# Patient Record
Sex: MALE | Race: WHITE
[De-identification: names, ages, dates, MRNs, and addresses within clinical notes are randomized per-mention and may not be internally consistent; named-entity substitution may affect disease eponyms.]

---

## 2018-07-02 ENCOUNTER — HOSPITAL ENCOUNTER (OUTPATIENT)
Dept: HOSPITAL 53 - M WUC | Age: 58
End: 2018-07-02
Attending: INTERNAL MEDICINE
Payer: COMMERCIAL

## 2018-07-02 DIAGNOSIS — E78.5: ICD-10-CM

## 2018-07-02 DIAGNOSIS — I25.10: Primary | ICD-10-CM

## 2018-07-02 LAB
ANION GAP: 7 MEQ/L (ref 8–16)
BLOOD UREA NITROGEN: 8 MG/DL (ref 7–18)
CALCIUM LEVEL: 8.6 MG/DL (ref 8.5–10.1)
CARBON DIOXIDE LEVEL: 30 MEQ/L (ref 21–32)
CHLORIDE LEVEL: 103 MEQ/L (ref 98–107)
CHOLEST SERPL-MCNC: 145 MG/DL (ref ?–200)
CHOLESTEROL RISK RATIO: 3.54 (ref ?–5)
CREATININE FOR GFR: 0.74 MG/DL (ref 0.7–1.3)
GFR SERPL CREATININE-BSD FRML MDRD: > 60 ML/MIN/{1.73_M2} (ref 56–?)
GLUCOSE, FASTING: 108 MG/DL (ref 70–100)
HDLC SERPL-MCNC: 41 MG/DL (ref 40–?)
HEMATOCRIT: 44 % (ref 42–52)
HEMOGLOBIN: 15.6 G/DL (ref 13.5–17.5)
LDL CHOLESTEROL: 82.8 MG/DL (ref ?–100)
MEAN CORPUSCULAR HEMOGLOBIN: 30.5 PG (ref 27–33)
MEAN CORPUSCULAR HGB CONC: 35.5 G/DL (ref 32–36.5)
MEAN CORPUSCULAR VOLUME: 85.9 FL (ref 80–96)
NONHDLC SERPL-MCNC: 104 MG/DL
NRBC BLD AUTO-RTO: 0 % (ref 0–0)
PLATELET COUNT, AUTOMATED: 168 10^3/UL (ref 150–450)
POTASSIUM SERUM: 3.8 MEQ/L (ref 3.5–5.1)
RED BLOOD COUNT: 5.12 10^6/UL (ref 4.3–6.1)
RED CELL DISTRIBUTION WIDTH: 13.2 % (ref 11.5–14.5)
SODIUM LEVEL: 140 MEQ/L (ref 136–145)
TRIGLYCERIDES LEVEL: 106 MG/DL (ref ?–150)
WHITE BLOOD COUNT: 10.1 10^3/UL (ref 4–10)

## 2018-07-02 PROCEDURE — 80061 LIPID PANEL: CPT

## 2020-01-24 ENCOUNTER — HOSPITAL ENCOUNTER (EMERGENCY)
Dept: HOSPITAL 53 - M ED | Age: 60
Discharge: HOME | End: 2020-01-24
Payer: COMMERCIAL

## 2020-01-24 VITALS — SYSTOLIC BLOOD PRESSURE: 169 MMHG | DIASTOLIC BLOOD PRESSURE: 94 MMHG

## 2020-01-24 VITALS — DIASTOLIC BLOOD PRESSURE: 89 MMHG | SYSTOLIC BLOOD PRESSURE: 166 MMHG

## 2020-01-24 VITALS — WEIGHT: 178.38 LBS | BODY MASS INDEX: 28 KG/M2 | HEIGHT: 67 IN

## 2020-01-24 DIAGNOSIS — Z79.83: ICD-10-CM

## 2020-01-24 DIAGNOSIS — F41.9: ICD-10-CM

## 2020-01-24 DIAGNOSIS — Z86.73: ICD-10-CM

## 2020-01-24 DIAGNOSIS — Z95.1: ICD-10-CM

## 2020-01-24 DIAGNOSIS — F17.210: ICD-10-CM

## 2020-01-24 DIAGNOSIS — R10.13: Primary | ICD-10-CM

## 2020-01-24 DIAGNOSIS — I10: ICD-10-CM

## 2020-01-24 DIAGNOSIS — Z79.899: ICD-10-CM

## 2020-01-24 LAB
ALBUMIN SERPL BCG-MCNC: 3.5 GM/DL (ref 3.2–5.2)
ALT SERPL W P-5'-P-CCNC: 30 U/L (ref 12–78)
BASOPHILS # BLD AUTO: 0.1 10^3/UL (ref 0–0.2)
BASOPHILS NFR BLD AUTO: 0.5 % (ref 0–1)
BILIRUB CONJ SERPL-MCNC: 0.2 MG/DL (ref 0–0.2)
BILIRUB SERPL-MCNC: 0.6 MG/DL (ref 0.2–1)
BUN SERPL-MCNC: 11 MG/DL (ref 7–18)
CALCIUM SERPL-MCNC: 8.8 MG/DL (ref 8.5–10.1)
CHLORIDE SERPL-SCNC: 108 MEQ/L (ref 98–107)
CK MB CFR.DF SERPL CALC: 1.13
CK MB CFR.DF SERPL CALC: 1.19
CK MB SERPL-MCNC: 1.2 NG/ML (ref ?–3.6)
CK MB SERPL-MCNC: 1.6 NG/ML (ref ?–3.6)
CK SERPL-CCNC: 101 U/L (ref 39–308)
CK SERPL-CCNC: 142 U/L (ref 39–308)
CO2 SERPL-SCNC: 29 MEQ/L (ref 21–32)
CREAT SERPL-MCNC: 0.65 MG/DL (ref 0.7–1.3)
EOSINOPHIL # BLD AUTO: 0.2 10^3/UL (ref 0–0.5)
EOSINOPHIL NFR BLD AUTO: 1.4 % (ref 0–3)
GFR SERPL CREATININE-BSD FRML MDRD: > 60 ML/MIN/{1.73_M2} (ref 56–?)
GLUCOSE SERPL-MCNC: 112 MG/DL (ref 70–100)
HCT VFR BLD AUTO: 45.5 % (ref 42–52)
HGB BLD-MCNC: 14.7 G/DL (ref 13.5–17.5)
LIPASE SERPL-CCNC: 123 U/L (ref 73–393)
LYMPHOCYTES # BLD AUTO: 1.5 10^3/UL (ref 1.5–5)
LYMPHOCYTES NFR BLD AUTO: 14.3 % (ref 24–44)
MCH RBC QN AUTO: 29 PG (ref 27–33)
MCHC RBC AUTO-ENTMCNC: 32.3 G/DL (ref 32–36.5)
MCV RBC AUTO: 89.7 FL (ref 80–96)
MONOCYTES # BLD AUTO: 1.4 10^3/UL (ref 0–0.8)
MONOCYTES NFR BLD AUTO: 13.6 % (ref 0–5)
NEUTROPHILS # BLD AUTO: 7.4 10^3/UL (ref 1.5–8.5)
NEUTROPHILS NFR BLD AUTO: 69.8 % (ref 36–66)
PLATELET # BLD AUTO: 169 10^3/UL (ref 150–450)
POTASSIUM SERPL-SCNC: 4 MEQ/L (ref 3.5–5.1)
PROT SERPL-MCNC: 7.4 GM/DL (ref 6.4–8.2)
RBC # BLD AUTO: 5.07 10^6/UL (ref 4.3–6.1)
SODIUM SERPL-SCNC: 142 MEQ/L (ref 136–145)
TROPONIN I SERPL-MCNC: 0.02 NG/ML (ref ?–0.1)
TROPONIN I SERPL-MCNC: < 0.02 NG/ML (ref ?–0.1)
WBC # BLD AUTO: 10.6 10^3/UL (ref 4–10)

## 2020-01-24 NOTE — ECGEPIP
[facility] - ED

                                       

                                       Test Date:    2020

Pat Name:     KELSEY GARRIDO           Department:   

Patient ID:   [id number]                 Room:         -

Gender:       Male                     Technician:   sb

:          1960               Requested By: PAULO GARNER

Order Number: [id number]     Reading MD:   Ramonita Woods

                                 Measurements

Intervals                              Axis          

Rate:         76                       P:            78

KS:           158                      QRS:          70

QRSD:         84                       T:            39

QT:           439                                    

QTc:          495                                    

                           Interpretive Statements

SINUS RHYTHM

PROLONGED QT INTERVAL

SIMILAR 3/6/16

Electronically Signed on 2020 20:26:53 EST by Ramonita Woods

## 2020-01-24 NOTE — REP
Chest x-ray:  Two views.

 

History:  Epigastric pain and shortness of breath.

 

Comparison chest x-ray:  May 9, 2016.

 

Findings:  The patient is status post prior median sternotomy.  The lungs are

well inflated and clear.  The pleural angles are sharp.  Heart size is normal.

Pulmonary vasculature is not increased.  No significant bony abnormality is seen.

Monitoring electrodes are noted.

 

Impression:

 

No acute disease.  Prior sternotomy.

 

 

Electronically Signed by

Andres Fisher MD 01/24/2020 08:03 A

## 2020-01-24 NOTE — ECGEPIP
[facility] - ED

                                       

                                       Test Date:    2020

Pat Name:     KELSEY GARRIDO           Department:   

Patient ID:   [id number]                 Room:         -

Gender:       Male                     Technician:   judson

:          1960               Requested By: PAULO GARNER

Order Number: [id number]     Reading MD:   Ramonita Woods

                                 Measurements

Intervals                              Axis          

Rate:         71                       P:            73

IN:           164                      QRS:          66

QRSD:         88                       T:            43

QT:           430                                    

QTc:          468                                    

                           Interpretive Statements

SINUS RHYTHM

PROLONGED QTC

SIMILAR 20

Electronically Signed on 2020 20:29:51 EST by Ramonita Woods

## 2021-04-14 ENCOUNTER — HOSPITAL ENCOUNTER (OUTPATIENT)
Dept: HOSPITAL 53 - M LAB REF | Age: 61
End: 2021-04-14
Attending: INTERNAL MEDICINE
Payer: COMMERCIAL

## 2021-04-14 DIAGNOSIS — M15.9: Primary | ICD-10-CM

## 2021-04-14 DIAGNOSIS — E78.5: ICD-10-CM

## 2021-04-14 DIAGNOSIS — I10: ICD-10-CM

## 2021-04-14 LAB
ALBUMIN SERPL BCG-MCNC: 4.3 GM/DL (ref 3.2–5.2)
ALT SERPL W P-5'-P-CCNC: 52 U/L (ref 12–78)
BASOPHILS # BLD AUTO: 0.1 10^3/UL (ref 0–0.2)
BASOPHILS NFR BLD AUTO: 0.6 % (ref 0–1)
BILIRUB SERPL-MCNC: 0.6 MG/DL (ref 0.2–1)
BUN SERPL-MCNC: 14 MG/DL (ref 7–18)
CALCIUM SERPL-MCNC: 10.7 MG/DL (ref 8.8–10.2)
CHLORIDE SERPL-SCNC: 102 MEQ/L (ref 98–107)
CHOLEST SERPL-MCNC: 169 MG/DL (ref ?–200)
CHOLEST/HDLC SERPL: 3.13 {RATIO} (ref ?–5)
CO2 SERPL-SCNC: 25 MEQ/L (ref 21–32)
CREAT SERPL-MCNC: 0.77 MG/DL (ref 0.7–1.3)
EOSINOPHIL # BLD AUTO: 0.2 10^3/UL (ref 0–0.5)
EOSINOPHIL NFR BLD AUTO: 1.7 % (ref 0–3)
ERYTHROCYTE [SEDIMENTATION RATE] IN BLOOD BY WESTERGREN METHOD: 8 MM/HR (ref 0–20)
GFR SERPL CREATININE-BSD FRML MDRD: > 60 ML/MIN/{1.73_M2} (ref 49–?)
GLUCOSE SERPL-MCNC: 100 MG/DL (ref 70–100)
HCT VFR BLD AUTO: 46.8 % (ref 42–52)
HDLC SERPL-MCNC: 54 MG/DL (ref 40–?)
HGB BLD-MCNC: 16.1 G/DL (ref 13.5–17.5)
LDLC SERPL CALC-MCNC: 85 MG/DL (ref ?–100)
LYMPHOCYTES # BLD AUTO: 2.4 10^3/UL (ref 1.5–5)
LYMPHOCYTES NFR BLD AUTO: 20.4 % (ref 24–44)
MCH RBC QN AUTO: 31.9 PG (ref 27–33)
MCHC RBC AUTO-ENTMCNC: 34.4 G/DL (ref 32–36.5)
MCV RBC AUTO: 92.7 FL (ref 80–96)
MONOCYTES # BLD AUTO: 1.5 10^3/UL (ref 0–0.8)
MONOCYTES NFR BLD AUTO: 12.8 % (ref 2–8)
NEUTROPHILS # BLD AUTO: 7.6 10^3/UL (ref 1.5–8.5)
NEUTROPHILS NFR BLD AUTO: 64.1 % (ref 36–66)
NONHDLC SERPL-MCNC: 115 MG/DL
PLATELET # BLD AUTO: 204 10^3/UL (ref 150–450)
POTASSIUM SERPL-SCNC: 4.2 MEQ/L (ref 3.5–5.1)
PROT SERPL-MCNC: 8.3 GM/DL (ref 6.4–8.2)
RBC # BLD AUTO: 5.05 10^6/UL (ref 4.3–6.1)
SODIUM SERPL-SCNC: 135 MEQ/L (ref 136–145)
TRIGL SERPL-MCNC: 151 MG/DL (ref ?–150)
TSH SERPL DL<=0.005 MIU/L-ACNC: 1.16 UIU/ML (ref 0.36–3.74)
WBC # BLD AUTO: 11.8 10^3/UL (ref 4–10)

## 2021-04-15 LAB — 25(OH)D3 SERPL-MCNC: 8.4 NG/ML (ref 30–100)

## 2021-04-27 ENCOUNTER — HOSPITAL ENCOUNTER (OUTPATIENT)
Dept: HOSPITAL 53 - M RAD | Age: 61
End: 2021-04-27
Attending: INTERNAL MEDICINE
Payer: COMMERCIAL

## 2021-04-27 DIAGNOSIS — R22.1: Primary | ICD-10-CM

## 2021-04-27 PROCEDURE — 70491 CT SOFT TISSUE NECK W/DYE: CPT

## 2021-04-27 NOTE — REPVR
PROCEDURE INFORMATION: 

Exam: CT Neck With Contrast 

Exam date and time: 4/27/2021 7:15 AM 

Age: 60 years old 

Clinical indication: Mass, lump, or swelling in neck; Additional info: Mass of 

neck left 



TECHNIQUE: 

Imaging protocol: Computed tomography images of the neck with contrast. 

Radiation optimization: All CT scans at this facility use at least one of these 

dose optimization techniques: automated exposure control; mA and/or kV 

adjustment per patient size (includes targeted exams where dose is matched to 

clinical indication); or iterative reconstruction. 

Contrast material: ISOVUE 370; Contrast volume: 75 ml; Contrast route: 

INTRAVENOUS (IV);  



COMPARISON: 

MRA CAROTID W/O FOL WITH 3/10/2016 9:32 AM 



FINDINGS: 

Paranasal sinuses: Retention cyst in the left maxillary sinus. 

Nasopharynx: Unremarkable. 

Oropharynx: Ill-defined nodular mucosal fullness along the right aspect the 

oropharynx with effacement of normal adjacent planes. 

Hypopharynx: Unremarkable. 

Larynx: Unremarkable. Normal epiglottis. 

Retropharyngeal space: Unremarkable. 

Submandibular/Parotid glands: Normal. Glands are normal in size. 

Thyroid: Normal. No enlarged or calcified nodules.  

Lymph nodes: Unremarkable. No lymphadenopathy. 



Trachea: Visualized trachea is unremarkable. 

Lungs: Unremarkable as visualized. 

Bones/joints: Multilevel degenerative disease. Stenosis of the spinal canal at 

several levels most pronounced at C3-C4, C5-C6, and C6-C7. Multilevel neural 

foraminal stenosis status post median sternotomy. 

Vasculature: Adjacent centrally necrotic lymphadenopathy appears to encase and 

occlude right jugular vein. 

Soft tissues: Unremarkable. No significant soft tissue swelling. 



IMPRESSION: 

Ill-defined nodular mucosal fullness along the right aspect the oropharynx with 

effacement of normal adjacent planes. Adjacent centrally necrotic 

lymphadenopathy appears to encase and occlude right jugular vein. Neoplastic 

etiology is favored. 



Electronically signed by: Sarthak Spencer On 04/27/2021  08:17:44 AM

## 2021-05-10 ENCOUNTER — HOSPITAL ENCOUNTER (OUTPATIENT)
Dept: HOSPITAL 53 - M RAD | Age: 61
End: 2021-05-10
Attending: INTERNAL MEDICINE
Payer: COMMERCIAL

## 2021-05-10 DIAGNOSIS — F17.210: ICD-10-CM

## 2021-05-10 DIAGNOSIS — Z12.2: Primary | ICD-10-CM

## 2021-05-11 NOTE — REP
INDICATION:

NICOTINE DEPEND



COMPARISON:

None.



TECHNIQUE:

Axial noncontrast images from the thoracic inlet to the upper abdomen using low-dose

lung screening technique (LDCT).



FINDINGS:

The bilateral lung fields are relatively well aerated, essentially symmetric and

clear.  No consolidation, suspicious nodule or mass lesion.  No effusion.  No

pneumothorax.  Tracheobronchial tree is patent.



IMPRESSION:

Lung-RADS category 1.  No suspicious nodule or mass lesion.

Management recommendations include annual low-dose CT surveillance.





<Electronically signed by Carlo Ortiz > 05/11/21 0849

## 2021-06-10 ENCOUNTER — HOSPITAL ENCOUNTER (OUTPATIENT)
Dept: HOSPITAL 53 - M RAD | Age: 61
End: 2021-06-10
Attending: INTERNAL MEDICINE
Payer: COMMERCIAL

## 2021-06-10 DIAGNOSIS — R09.89: Primary | ICD-10-CM

## 2021-06-10 NOTE — REP
INDICATION:

ABD PULSITILE MASS



COMPARISON:

None.



TECHNIQUE:

Real time gray scale ultrasound examination using curved array transducer.



FINDINGS:

Abdominal aortic aneurysm is identified extending from the level of the renal arteries

through the distal aorta and measures approximately 6.5 x 7.5 cm maximal diameter with

mural thrombus and a patent residual lumen of roughly 3.5 x 3.8 cm diameter.



Proximal aorta:  3.0 x 3.2 cm

Aorta at renal arteries:  4.5 x 4.3 cm

Mid aorta:  6.5 x 7.4 cm

Distal aorta:  5.2 x 5.7 cm

Right common iliac artery:  1.0 x 1.3 cm

Left common iliac artery:  1.0 x 1.2 cm



IMPRESSION:

Abdominal aortic aneurysm.





<Electronically signed by Carlo Ortiz > 06/10/21 0866

## 2021-06-22 ENCOUNTER — HOSPITAL ENCOUNTER (OUTPATIENT)
Dept: HOSPITAL 53 - M ONCR | Age: 61
End: 2021-06-22
Attending: RADIOLOGY
Payer: COMMERCIAL

## 2021-06-22 DIAGNOSIS — E78.5: ICD-10-CM

## 2021-06-22 DIAGNOSIS — F41.9: ICD-10-CM

## 2021-06-22 DIAGNOSIS — I71.4: ICD-10-CM

## 2021-06-22 DIAGNOSIS — C09.0: Primary | ICD-10-CM

## 2021-06-22 DIAGNOSIS — I10: ICD-10-CM

## 2021-06-22 DIAGNOSIS — Z79.899: ICD-10-CM

## 2021-06-22 DIAGNOSIS — F17.210: ICD-10-CM

## 2021-06-22 DIAGNOSIS — Z86.73: ICD-10-CM

## 2021-06-22 PROCEDURE — 31575 DIAGNOSTIC LARYNGOSCOPY: CPT

## 2021-07-30 ENCOUNTER — HOSPITAL ENCOUNTER (OUTPATIENT)
Dept: HOSPITAL 53 - M ONCR | Age: 61
LOS: 1 days | End: 2021-07-31
Attending: RADIOLOGY
Payer: COMMERCIAL

## 2021-07-30 DIAGNOSIS — C09.0: Primary | ICD-10-CM

## 2021-08-09 ENCOUNTER — HOSPITAL ENCOUNTER (EMERGENCY)
Dept: HOSPITAL 53 - M ED | Age: 61
Discharge: HOME | End: 2021-08-09
Payer: COMMERCIAL

## 2021-08-09 VITALS — SYSTOLIC BLOOD PRESSURE: 121 MMHG | DIASTOLIC BLOOD PRESSURE: 75 MMHG

## 2021-08-09 VITALS — WEIGHT: 179.02 LBS | HEIGHT: 68 IN | BODY MASS INDEX: 27.13 KG/M2

## 2021-08-09 DIAGNOSIS — E78.5: ICD-10-CM

## 2021-08-09 DIAGNOSIS — F17.200: ICD-10-CM

## 2021-08-09 DIAGNOSIS — I10: ICD-10-CM

## 2021-08-09 DIAGNOSIS — C09.9: ICD-10-CM

## 2021-08-09 DIAGNOSIS — I95.9: Primary | ICD-10-CM

## 2021-08-09 LAB
ALBUMIN SERPL BCG-MCNC: 2.9 GM/DL (ref 3.2–5.2)
ALT SERPL W P-5'-P-CCNC: 30 U/L (ref 12–78)
APTT BLD: 31.5 SECONDS (ref 25.9–37)
BASOPHILS # BLD AUTO: 0 10^3/UL (ref 0–0.2)
BASOPHILS NFR BLD AUTO: 0.6 % (ref 0–1)
BILIRUB CONJ SERPL-MCNC: 0.2 MG/DL (ref 0–0.2)
BILIRUB SERPL-MCNC: 0.4 MG/DL (ref 0.2–1)
BUN SERPL-MCNC: 43 MG/DL (ref 7–18)
CALCIUM SERPL-MCNC: 7.3 MG/DL (ref 8.8–10.2)
CHLORIDE SERPL-SCNC: 113 MEQ/L (ref 98–107)
CK MB CFR.DF SERPL CALC: 3.66
CK MB SERPL-MCNC: 1.5 NG/ML (ref ?–3.6)
CK SERPL-CCNC: 41 U/L (ref 39–308)
CO2 SERPL-SCNC: 22 MEQ/L (ref 21–32)
CREAT SERPL-MCNC: 1.4 MG/DL (ref 0.7–1.3)
EOSINOPHIL # BLD AUTO: 0 10^3/UL (ref 0–0.5)
EOSINOPHIL NFR BLD AUTO: 0.3 % (ref 0–3)
GFR SERPL CREATININE-BSD FRML MDRD: 55 ML/MIN/{1.73_M2} (ref 49–?)
GLUCOSE SERPL-MCNC: 81 MG/DL (ref 70–100)
HCT VFR BLD AUTO: 39.6 % (ref 42–52)
HGB BLD-MCNC: 13.5 G/DL (ref 13.5–17.5)
INR PPP: 1.06
LYMPHOCYTES # BLD AUTO: 0.8 10^3/UL (ref 1.5–5)
LYMPHOCYTES NFR BLD AUTO: 12.6 % (ref 24–44)
MCH RBC QN AUTO: 31.8 PG (ref 27–33)
MCHC RBC AUTO-ENTMCNC: 34.1 G/DL (ref 32–36.5)
MCV RBC AUTO: 93.2 FL (ref 80–96)
MONOCYTES # BLD AUTO: 1.2 10^3/UL (ref 0–0.8)
MONOCYTES NFR BLD AUTO: 17.5 % (ref 2–8)
NEUTROPHILS # BLD AUTO: 4.6 10^3/UL (ref 1.5–8.5)
NEUTROPHILS NFR BLD AUTO: 68.6 % (ref 36–66)
PLATELET # BLD AUTO: 106 10^3/UL (ref 150–450)
POTASSIUM SERPL-SCNC: 4.9 MEQ/L (ref 3.5–5.1)
PROT SERPL-MCNC: 5.9 GM/DL (ref 6.4–8.2)
PROTHROMBIN TIME: 14.2 SECONDS (ref 12.7–14.5)
RBC # BLD AUTO: 4.25 10^6/UL (ref 4.3–6.1)
SODIUM SERPL-SCNC: 140 MEQ/L (ref 136–145)
TROPONIN I SERPL-MCNC: < 0.02 NG/ML (ref ?–0.1)
WBC # BLD AUTO: 6.7 10^3/UL (ref 4–10)

## 2021-08-09 NOTE — ECGEPIP
[facility] - ED

                                       

                                       Test Date:    2021

Pat Name:     KELSEY GARRIDO           Department:   

Patient ID:   [id number]                 Room:         -

Gender:       Male                     Technician:   vc

:          1960               Requested By: JAIDEN PROCTOR

Order Number: [id number]     Reading MD:   Ramonita Woods

                                 Measurements

Intervals                              Axis          

Rate:         52                       P:            10

MT:           150                      QRS:          65

QRSD:         88                       T:            23

QT:           426                                    

QTc:          396                                    

                           Interpretive Statements

Sinus bradycardia

NSTTW abnormalities

Electronically Signed on 2021 19:22:42 EDT by Ramonita Woods

## 2021-08-09 NOTE — REP
INDICATION:

Abdominal Pain.



COMPARISON:

01/24/2020



TECHNIQUE:

Two views



FINDINGS:

The lungs are free of active parenchymal disease.  Bilateral nipple shadows are noted.

Heart is not enlarged.  Postoperative changes after CABG noted.  There is no failure

or effusion.  No hilar or mediastinal adenopathy.



IMPRESSION:

No active process and no interval change compared with the previous study.





<Electronically signed by Delonte Kumar > 08/09/21 1524

## 2021-08-11 ENCOUNTER — HOSPITAL ENCOUNTER (OUTPATIENT)
Dept: HOSPITAL 53 - M ONCM | Age: 61
End: 2021-08-11
Attending: RADIOLOGY
Payer: COMMERCIAL

## 2021-08-11 DIAGNOSIS — C09.0: Primary | ICD-10-CM

## 2021-08-13 VITALS — DIASTOLIC BLOOD PRESSURE: 69 MMHG | SYSTOLIC BLOOD PRESSURE: 101 MMHG

## 2021-08-20 ENCOUNTER — HOSPITAL ENCOUNTER (OUTPATIENT)
Dept: HOSPITAL 53 - M RAD | Age: 61
End: 2021-08-20
Payer: COMMERCIAL

## 2021-08-20 DIAGNOSIS — I71.4: Primary | ICD-10-CM

## 2021-08-20 PROCEDURE — 74174 CTA ABD&PLVS W/CONTRAST: CPT

## 2021-08-22 NOTE — REP
INDICATION:

ABD AORTIC ANEURYSM



COMPARISON:

Correlation with ultrasound dated 06/10/2021



TECHNIQUE:

Axial contrast-enhanced images from the lung bases to the pubic symphysis with coronal

and sagittal reformations using CT angiogram technique and protocol.



Post processing used to create volume rendered 3D CT aortogram.



This CT examination was performed using the following dose reduction techniques:

Automated exposure control, adjustment of mA and/or kv according to the patient's

size, and use of iterative reconstruction technique.



FINDINGS:

There is an abdominal aortic aneurysm which measures 7.3 cm maximal diameter and

appears partially thrombosed with a maximal patent somewhat irregular shaped lumen

measuring up to 4.2 cm maximal diameter and surrounding chronic thrombus.  The

aneurysm slowly begins at the level of the renal arteries measuring roughly 3.6 cm in

maximal AP diameter and extending to the iliac bifurcation.  No periaortic

inflammatory stranding or fluid is identified to suggest rupture.  Extensive partially

calcified atherosclerotic changes involving the aorta and bilateral iliac arteries are

also identified.  The origins and proximal portions of the celiac axis, superior

mesenteric artery, and solitary bilateral renal arteries all demonstrate relatively

normal patent lumen.



Liver, spleen, pancreas, gallbladder, bilateral adrenal glands and kidneys are

relatively normal for arterial phase enhancement.  The enteric system is without

obstruction or acute inflammatory process.  Pelvis demonstrates normal bladder and

age-appropriate prostate/seminal vesicles.



No ascites.  No free air.  No adenopathy.  Musculoskeletal structures demonstrate

age-related degenerative changes without acute osseous abnormality.  Lung bases are

clear.



IMPRESSION:

Significant abdominal aortic aneurysm as described above.





<Electronically signed by Carlo Ortiz > 08/22/21 4500

## 2021-08-31 ENCOUNTER — HOSPITAL ENCOUNTER (OUTPATIENT)
Dept: HOSPITAL 53 - M ONCR | Age: 61
End: 2021-08-31
Attending: RADIOLOGY
Payer: COMMERCIAL

## 2021-08-31 DIAGNOSIS — C09.0: Primary | ICD-10-CM

## 2021-09-03 ENCOUNTER — HOSPITAL ENCOUNTER (OUTPATIENT)
Dept: HOSPITAL 53 - M ONCR | Age: 61
LOS: 27 days | End: 2021-09-30
Attending: RADIOLOGY
Payer: COMMERCIAL

## 2021-09-03 DIAGNOSIS — C09.0: Primary | ICD-10-CM

## 2021-09-14 ENCOUNTER — HOSPITAL ENCOUNTER (EMERGENCY)
Dept: HOSPITAL 53 - M ED | Age: 61
Discharge: HOME | End: 2021-09-14
Payer: COMMERCIAL

## 2021-09-14 VITALS — DIASTOLIC BLOOD PRESSURE: 72 MMHG | SYSTOLIC BLOOD PRESSURE: 128 MMHG

## 2021-09-14 VITALS — HEIGHT: 68 IN | BODY MASS INDEX: 23.89 KG/M2 | WEIGHT: 157.63 LBS

## 2021-09-14 DIAGNOSIS — R06.02: ICD-10-CM

## 2021-09-14 DIAGNOSIS — E83.42: ICD-10-CM

## 2021-09-14 DIAGNOSIS — N17.9: ICD-10-CM

## 2021-09-14 DIAGNOSIS — R11.0: ICD-10-CM

## 2021-09-14 DIAGNOSIS — R53.1: ICD-10-CM

## 2021-09-14 DIAGNOSIS — Z95.1: ICD-10-CM

## 2021-09-14 DIAGNOSIS — E86.0: Primary | ICD-10-CM

## 2021-09-14 LAB
ALBUMIN SERPL BCG-MCNC: 3.3 GM/DL (ref 3.2–5.2)
ALT SERPL W P-5'-P-CCNC: 51 U/L (ref 12–78)
BASOPHILS # BLD AUTO: 0.1 10^3/UL (ref 0–0.2)
BASOPHILS NFR BLD AUTO: 1 % (ref 0–1)
BILIRUB CONJ SERPL-MCNC: 0.2 MG/DL (ref 0–0.2)
BILIRUB SERPL-MCNC: 0.6 MG/DL (ref 0.2–1)
EOSINOPHIL # BLD AUTO: 0.1 10^3/UL (ref 0–0.5)
EOSINOPHIL NFR BLD AUTO: 0.7 % (ref 0–3)
FERRITIN SERPL-MCNC: 900 NG/ML (ref 26–388)
FOLATE SERPL-MCNC: 5.5 NG/ML (ref 5.4–?)
HCT VFR BLD AUTO: 36 % (ref 42–52)
HGB BLD-MCNC: 12.8 G/DL (ref 13.5–17.5)
IRON SATN MFR SERPL: 34.9 % (ref 19.7–50)
IRON SERPL-MCNC: 89 UG/DL (ref 65–175)
LYMPHOCYTES # BLD AUTO: 0.6 10^3/UL (ref 1.5–5)
LYMPHOCYTES NFR BLD AUTO: 7.9 % (ref 24–44)
MAGNESIUM SERPL-MCNC: 1.4 MG/DL (ref 1.8–2.4)
MCH RBC QN AUTO: 33 PG (ref 27–33)
MCHC RBC AUTO-ENTMCNC: 35.6 G/DL (ref 32–36.5)
MCV RBC AUTO: 92.8 FL (ref 80–96)
MONOCYTES # BLD AUTO: 1.2 10^3/UL (ref 0–0.8)
MONOCYTES NFR BLD AUTO: 17.1 % (ref 2–8)
NEUTROPHILS # BLD AUTO: 5.1 10^3/UL (ref 1.5–8.5)
NEUTROPHILS NFR BLD AUTO: 72.6 % (ref 36–66)
PLATELET # BLD AUTO: 133 10^3/UL (ref 150–450)
PROT SERPL-MCNC: 7.2 GM/DL (ref 6.4–8.2)
RBC # BLD AUTO: 3.88 10^6/UL (ref 4.3–6.1)
TIBC SERPL-MCNC: 255 UG/DL (ref 250–450)
VIT B12 SERPL-MCNC: > 2000 PG/ML (ref 247–911)
WBC # BLD AUTO: 7.1 10^3/UL (ref 4–10)

## 2021-09-14 PROCEDURE — 83550 IRON BINDING TEST: CPT

## 2021-09-14 PROCEDURE — 80047 BASIC METABLC PNL IONIZED CA: CPT

## 2021-09-14 PROCEDURE — 83735 ASSAY OF MAGNESIUM: CPT

## 2021-09-14 PROCEDURE — 82607 VITAMIN B-12: CPT

## 2021-09-14 PROCEDURE — 80076 HEPATIC FUNCTION PANEL: CPT

## 2021-09-14 PROCEDURE — 85025 COMPLETE CBC W/AUTO DIFF WBC: CPT

## 2021-09-14 PROCEDURE — 82728 ASSAY OF FERRITIN: CPT

## 2021-09-14 PROCEDURE — 96375 TX/PRO/DX INJ NEW DRUG ADDON: CPT

## 2021-09-14 PROCEDURE — 96361 HYDRATE IV INFUSION ADD-ON: CPT

## 2021-09-14 PROCEDURE — 96365 THER/PROPH/DIAG IV INF INIT: CPT

## 2021-09-14 PROCEDURE — 99284 EMERGENCY DEPT VISIT MOD MDM: CPT

## 2021-09-14 PROCEDURE — 82746 ASSAY OF FOLIC ACID SERUM: CPT

## 2021-11-29 ENCOUNTER — HOSPITAL ENCOUNTER (OUTPATIENT)
Dept: HOSPITAL 53 - M PLARAD | Age: 61
End: 2021-11-29
Attending: RADIOLOGY
Payer: COMMERCIAL

## 2021-11-29 ENCOUNTER — HOSPITAL ENCOUNTER (OUTPATIENT)
Dept: HOSPITAL 53 - M ONCR | Age: 61
End: 2021-11-29
Attending: RADIOLOGY
Payer: COMMERCIAL

## 2021-11-29 DIAGNOSIS — C09.0: Primary | ICD-10-CM

## 2021-11-29 DIAGNOSIS — Z92.3: ICD-10-CM

## 2021-11-29 DIAGNOSIS — F17.210: ICD-10-CM

## 2021-11-29 DIAGNOSIS — Z79.891: ICD-10-CM

## 2021-11-29 DIAGNOSIS — Z79.899: ICD-10-CM

## 2022-02-02 ENCOUNTER — HOSPITAL ENCOUNTER (OUTPATIENT)
Dept: HOSPITAL 53 - M ONCR | Age: 62
End: 2022-02-02
Attending: RADIOLOGY
Payer: COMMERCIAL

## 2022-02-02 DIAGNOSIS — F17.210: ICD-10-CM

## 2022-02-02 DIAGNOSIS — Z92.21: ICD-10-CM

## 2022-02-02 DIAGNOSIS — C09.0: Primary | ICD-10-CM

## 2022-02-02 DIAGNOSIS — Z92.3: ICD-10-CM

## 2022-02-02 DIAGNOSIS — Z79.899: ICD-10-CM

## 2022-02-02 PROCEDURE — 31575 DIAGNOSTIC LARYNGOSCOPY: CPT

## 2022-03-18 ENCOUNTER — HOSPITAL ENCOUNTER (OUTPATIENT)
Dept: HOSPITAL 53 - M LAB | Age: 62
End: 2022-03-18
Payer: COMMERCIAL

## 2022-03-18 DIAGNOSIS — Z01.812: Primary | ICD-10-CM

## 2022-03-18 LAB
BUN SERPL-MCNC: 30 MG/DL (ref 7–18)
CALCIUM SERPL-MCNC: 10 MG/DL (ref 8.8–10.2)
CHLORIDE SERPL-SCNC: 101 MEQ/L (ref 98–107)
CO2 SERPL-SCNC: 29 MEQ/L (ref 21–32)
CREAT SERPL-MCNC: 1.6 MG/DL (ref 0.7–1.3)
GFR SERPL CREATININE-BSD FRML MDRD: 47 ML/MIN/{1.73_M2} (ref 49–?)
GLUCOSE SERPL-MCNC: 96 MG/DL (ref 70–100)
POTASSIUM SERPL-SCNC: 4.8 MEQ/L (ref 3.5–5.1)
SODIUM SERPL-SCNC: 134 MEQ/L (ref 136–145)

## 2022-03-23 ENCOUNTER — HOSPITAL ENCOUNTER (OUTPATIENT)
Dept: HOSPITAL 53 - M PLAIMG | Age: 62
End: 2022-03-23
Payer: COMMERCIAL

## 2022-03-23 DIAGNOSIS — I71.4: Primary | ICD-10-CM

## 2022-03-23 PROCEDURE — 74174 CTA ABD&PLVS W/CONTRAST: CPT

## 2022-04-06 ENCOUNTER — HOSPITAL ENCOUNTER (OUTPATIENT)
Dept: HOSPITAL 53 - M ONCR | Age: 62
End: 2022-04-06
Attending: RADIOLOGY
Payer: COMMERCIAL

## 2022-04-06 DIAGNOSIS — Z92.21: ICD-10-CM

## 2022-04-06 DIAGNOSIS — Z92.3: ICD-10-CM

## 2022-04-06 DIAGNOSIS — Z87.891: ICD-10-CM

## 2022-04-06 DIAGNOSIS — C09.0: Primary | ICD-10-CM

## 2022-04-06 PROCEDURE — 31575 DIAGNOSTIC LARYNGOSCOPY: CPT

## 2022-04-10 ENCOUNTER — HOSPITAL ENCOUNTER (OUTPATIENT)
Dept: HOSPITAL 53 - M WUC | Age: 62
End: 2022-04-10
Attending: PHYSICIAN ASSISTANT
Payer: COMMERCIAL

## 2022-04-10 DIAGNOSIS — R50.9: Primary | ICD-10-CM

## 2022-07-06 ENCOUNTER — HOSPITAL ENCOUNTER (OUTPATIENT)
Dept: HOSPITAL 53 - M ONCR | Age: 62
End: 2022-07-06
Attending: RADIOLOGY
Payer: COMMERCIAL

## 2022-07-06 DIAGNOSIS — Z85.818: ICD-10-CM

## 2022-07-06 DIAGNOSIS — Z87.891: ICD-10-CM

## 2022-07-06 DIAGNOSIS — Z92.21: ICD-10-CM

## 2022-07-06 DIAGNOSIS — K13.70: ICD-10-CM

## 2022-07-06 DIAGNOSIS — Z79.899: ICD-10-CM

## 2022-07-06 DIAGNOSIS — Z08: Primary | ICD-10-CM

## 2022-07-06 DIAGNOSIS — L59.8: ICD-10-CM

## 2022-07-06 DIAGNOSIS — Z92.3: ICD-10-CM

## 2022-07-06 PROCEDURE — 31575 DIAGNOSTIC LARYNGOSCOPY: CPT

## 2022-08-04 ENCOUNTER — HOSPITAL ENCOUNTER (OUTPATIENT)
Dept: HOSPITAL 53 - M RAD | Age: 62
End: 2022-08-04
Attending: RADIOLOGY
Payer: COMMERCIAL

## 2022-08-04 DIAGNOSIS — C09.0: ICD-10-CM

## 2022-08-04 DIAGNOSIS — R91.8: Primary | ICD-10-CM

## 2022-08-04 PROCEDURE — 71260 CT THORAX DX C+: CPT

## 2022-08-04 PROCEDURE — 70491 CT SOFT TISSUE NECK W/DYE: CPT

## 2022-10-21 ENCOUNTER — HOSPITAL ENCOUNTER (OUTPATIENT)
Dept: HOSPITAL 53 - M ONCR | Age: 62
End: 2022-10-21
Attending: RADIOLOGY
Payer: COMMERCIAL

## 2022-10-21 DIAGNOSIS — C09.0: Primary | ICD-10-CM

## 2022-10-21 DIAGNOSIS — Z79.899: ICD-10-CM

## 2022-10-21 DIAGNOSIS — Z92.21: ICD-10-CM

## 2022-10-21 DIAGNOSIS — D10.9: ICD-10-CM

## 2022-10-21 DIAGNOSIS — Z92.3: ICD-10-CM

## 2022-10-21 DIAGNOSIS — Z87.891: ICD-10-CM

## 2022-10-21 DIAGNOSIS — Z79.1: ICD-10-CM

## 2022-10-21 PROCEDURE — 31575 DIAGNOSTIC LARYNGOSCOPY: CPT

## 2023-01-20 ENCOUNTER — HOSPITAL ENCOUNTER (OUTPATIENT)
Dept: HOSPITAL 53 - M ONCR | Age: 63
End: 2023-01-20
Attending: RADIOLOGY
Payer: COMMERCIAL

## 2023-01-20 DIAGNOSIS — R91.8: ICD-10-CM

## 2023-01-20 DIAGNOSIS — Z92.21: ICD-10-CM

## 2023-01-20 DIAGNOSIS — Z92.3: ICD-10-CM

## 2023-01-20 DIAGNOSIS — C09.0: Primary | ICD-10-CM

## 2023-01-20 DIAGNOSIS — Z87.891: ICD-10-CM

## 2023-01-20 DIAGNOSIS — Z79.899: ICD-10-CM

## 2023-01-20 DIAGNOSIS — Z79.1: ICD-10-CM

## 2023-01-20 PROCEDURE — 31575 DIAGNOSTIC LARYNGOSCOPY: CPT

## 2023-01-26 ENCOUNTER — HOSPITAL ENCOUNTER (OUTPATIENT)
Dept: HOSPITAL 53 - M RAD | Age: 63
End: 2023-01-26
Attending: RADIOLOGY
Payer: COMMERCIAL

## 2023-01-26 DIAGNOSIS — J44.9: ICD-10-CM

## 2023-01-26 DIAGNOSIS — R91.8: ICD-10-CM

## 2023-01-26 DIAGNOSIS — C09.0: Primary | ICD-10-CM

## 2023-03-30 ENCOUNTER — HOSPITAL ENCOUNTER (OUTPATIENT)
Dept: HOSPITAL 53 - M RAD | Age: 63
End: 2023-03-30
Attending: SURGERY
Payer: COMMERCIAL

## 2023-03-30 DIAGNOSIS — I71.40: Primary | ICD-10-CM

## 2023-03-30 PROCEDURE — 74174 CTA ABD&PLVS W/CONTRAST: CPT

## 2023-04-20 ENCOUNTER — HOSPITAL ENCOUNTER (OUTPATIENT)
Dept: HOSPITAL 53 - M ONCR | Age: 63
End: 2023-04-20
Attending: RADIOLOGY
Payer: COMMERCIAL

## 2023-04-20 DIAGNOSIS — Z08: Primary | ICD-10-CM

## 2023-04-20 DIAGNOSIS — Z79.1: ICD-10-CM

## 2023-04-20 DIAGNOSIS — Z92.3: ICD-10-CM

## 2023-04-20 DIAGNOSIS — R91.8: ICD-10-CM

## 2023-04-20 DIAGNOSIS — Z92.21: ICD-10-CM

## 2023-04-20 DIAGNOSIS — Z79.899: ICD-10-CM

## 2023-04-20 DIAGNOSIS — Z85.819: ICD-10-CM

## 2023-04-20 DIAGNOSIS — Z87.891: ICD-10-CM

## 2023-04-20 PROCEDURE — 31575 DIAGNOSTIC LARYNGOSCOPY: CPT

## 2023-10-13 ENCOUNTER — HOSPITAL ENCOUNTER (OUTPATIENT)
Dept: HOSPITAL 53 - M PLAIMG | Age: 63
End: 2023-10-13
Attending: RADIOLOGY
Payer: COMMERCIAL

## 2023-10-13 DIAGNOSIS — R91.1: ICD-10-CM

## 2023-10-13 DIAGNOSIS — D38.1: Primary | ICD-10-CM

## 2023-11-01 ENCOUNTER — HOSPITAL ENCOUNTER (OUTPATIENT)
Dept: HOSPITAL 53 - M ONCR | Age: 63
End: 2023-11-01
Attending: RADIOLOGY
Payer: COMMERCIAL

## 2023-11-01 DIAGNOSIS — Z87.891: ICD-10-CM

## 2023-11-01 DIAGNOSIS — Z79.1: ICD-10-CM

## 2023-11-01 DIAGNOSIS — Z85.818: ICD-10-CM

## 2023-11-01 DIAGNOSIS — Z71.2: ICD-10-CM

## 2023-11-01 DIAGNOSIS — Z79.899: ICD-10-CM

## 2023-11-01 DIAGNOSIS — Z92.21: ICD-10-CM

## 2023-11-01 DIAGNOSIS — R91.1: Primary | ICD-10-CM

## 2023-11-01 DIAGNOSIS — Z92.3: ICD-10-CM

## 2023-11-01 PROCEDURE — 31575 DIAGNOSTIC LARYNGOSCOPY: CPT

## 2023-11-17 ENCOUNTER — HOSPITAL ENCOUNTER (OUTPATIENT)
Dept: HOSPITAL 53 - M IRPRO | Age: 63
End: 2023-11-17
Attending: RADIOLOGY
Payer: COMMERCIAL

## 2023-11-17 VITALS — SYSTOLIC BLOOD PRESSURE: 118 MMHG | OXYGEN SATURATION: 96 % | DIASTOLIC BLOOD PRESSURE: 81 MMHG

## 2023-11-17 VITALS — TEMPERATURE: 98.2 F

## 2023-11-17 DIAGNOSIS — Z95.5: ICD-10-CM

## 2023-11-17 DIAGNOSIS — R91.1: Primary | ICD-10-CM

## 2023-11-17 DIAGNOSIS — J95.811: ICD-10-CM

## 2024-02-02 ENCOUNTER — HOSPITAL ENCOUNTER (OUTPATIENT)
Dept: HOSPITAL 53 - M ONCR | Age: 64
End: 2024-02-02
Attending: RADIOLOGY
Payer: SELF-PAY

## 2024-02-02 DIAGNOSIS — Z79.899: ICD-10-CM

## 2024-02-02 DIAGNOSIS — Z92.21: ICD-10-CM

## 2024-02-02 DIAGNOSIS — Z71.2: ICD-10-CM

## 2024-02-02 DIAGNOSIS — Z08: Primary | ICD-10-CM

## 2024-02-02 DIAGNOSIS — Z79.1: ICD-10-CM

## 2024-02-02 DIAGNOSIS — Z87.891: ICD-10-CM

## 2024-02-02 DIAGNOSIS — Z92.3: ICD-10-CM

## 2024-02-02 DIAGNOSIS — Z85.818: ICD-10-CM

## 2024-02-02 PROCEDURE — 31575 DIAGNOSTIC LARYNGOSCOPY: CPT

## 2024-07-05 ENCOUNTER — HOSPITAL ENCOUNTER (OUTPATIENT)
Dept: HOSPITAL 53 - M RAD | Age: 64
End: 2024-07-05
Attending: INTERNAL MEDICINE
Payer: COMMERCIAL

## 2024-07-05 DIAGNOSIS — C09.9: ICD-10-CM

## 2024-07-05 DIAGNOSIS — R91.8: Primary | ICD-10-CM

## 2024-07-05 PROCEDURE — 71260 CT THORAX DX C+: CPT

## 2024-09-09 ENCOUNTER — HOSPITAL ENCOUNTER (OUTPATIENT)
Dept: HOSPITAL 53 - M PLARAD | Age: 64
End: 2024-09-09
Attending: INTERNAL MEDICINE
Payer: COMMERCIAL

## 2024-09-09 DIAGNOSIS — C09.9: Primary | ICD-10-CM

## 2024-09-09 PROCEDURE — 78815 PET IMAGE W/CT SKULL-THIGH: CPT

## 2024-09-30 ENCOUNTER — HOSPITAL ENCOUNTER (EMERGENCY)
Dept: HOSPITAL 53 - M ED | Age: 64
Discharge: HOME | End: 2024-09-30
Payer: COMMERCIAL

## 2024-09-30 VITALS — BODY MASS INDEX: 22.82 KG/M2 | WEIGHT: 141.98 LBS | HEIGHT: 66 IN

## 2024-09-30 VITALS — TEMPERATURE: 97.1 F | OXYGEN SATURATION: 97 % | DIASTOLIC BLOOD PRESSURE: 60 MMHG | SYSTOLIC BLOOD PRESSURE: 90 MMHG

## 2024-09-30 DIAGNOSIS — M19.011: Primary | ICD-10-CM

## 2024-09-30 DIAGNOSIS — Z87.891: ICD-10-CM

## 2024-09-30 DIAGNOSIS — Z79.899: ICD-10-CM

## 2024-09-30 DIAGNOSIS — Z79.01: ICD-10-CM

## 2024-09-30 DIAGNOSIS — E03.9: ICD-10-CM

## 2024-09-30 DIAGNOSIS — I10: ICD-10-CM

## 2024-09-30 DIAGNOSIS — Z85.21: ICD-10-CM

## 2024-09-30 RX ADMIN — LIDOCAINE 4% ONE DOSE: 4 CREAM TOPICAL at 08:18

## 2024-10-04 ENCOUNTER — HOSPITAL ENCOUNTER (OUTPATIENT)
Dept: HOSPITAL 53 - M IRPRO | Age: 64
End: 2024-10-04
Attending: PHYSICIAN ASSISTANT
Payer: COMMERCIAL

## 2024-10-04 VITALS — TEMPERATURE: 97.5 F

## 2024-10-04 VITALS — OXYGEN SATURATION: 98 % | SYSTOLIC BLOOD PRESSURE: 109 MMHG | DIASTOLIC BLOOD PRESSURE: 59 MMHG

## 2024-10-04 DIAGNOSIS — C09.9: ICD-10-CM

## 2024-10-04 DIAGNOSIS — C34.11: ICD-10-CM

## 2024-10-04 DIAGNOSIS — R91.1: Primary | ICD-10-CM

## 2024-10-04 LAB
HCT VFR BLD AUTO: 42.6 % (ref 42–52)
HGB BLD-MCNC: 14.6 G/DL (ref 13.5–17.5)
INR PPP: 1.16
MCH RBC QN AUTO: 32.8 PG (ref 27–33)
MCHC RBC AUTO-ENTMCNC: 34.3 G/DL (ref 32–36.5)
MCV RBC AUTO: 95.7 FL (ref 80–96)
PLATELET # BLD AUTO: 173 10^3/UL (ref 150–450)
PROTHROMBIN TIME: 14.5 SECONDS (ref 12.5–14.5)
RBC # BLD AUTO: 4.45 10^6/UL (ref 4.3–6.1)
WBC # BLD AUTO: 9 10^3/UL (ref 4–10)

## 2024-11-04 ENCOUNTER — HOSPITAL ENCOUNTER (OUTPATIENT)
Dept: HOSPITAL 53 - M ONCR | Age: 64
End: 2024-11-04
Attending: RADIOLOGY
Payer: COMMERCIAL

## 2024-11-04 DIAGNOSIS — C09.0: ICD-10-CM

## 2024-11-04 DIAGNOSIS — Z87.891: ICD-10-CM

## 2024-11-04 DIAGNOSIS — C34.11: Primary | ICD-10-CM

## 2024-11-04 DIAGNOSIS — Z79.890: ICD-10-CM

## 2024-11-04 DIAGNOSIS — Z79.01: ICD-10-CM

## 2024-11-04 DIAGNOSIS — Z92.3: ICD-10-CM

## 2024-11-04 DIAGNOSIS — Z79.899: ICD-10-CM

## 2024-11-04 DIAGNOSIS — Z92.21: ICD-10-CM

## 2024-11-04 LAB
T3FREE SERPL-MCNC: 3.2 PG/ML (ref 2.3–4.2)
T4 FREE SERPL-MCNC: 0.79 NG/DL (ref 0.89–1.76)
TSH SERPL DL<=0.005 MIU/L-ACNC: 8.82 UIU/ML (ref 0.55–4.78)

## 2024-11-04 PROCEDURE — 84443 ASSAY THYROID STIM HORMONE: CPT

## 2024-11-04 PROCEDURE — 36415 COLL VENOUS BLD VENIPUNCTURE: CPT

## 2024-11-04 PROCEDURE — 84481 FREE ASSAY (FT-3): CPT

## 2024-11-04 PROCEDURE — 84439 ASSAY OF FREE THYROXINE: CPT

## 2024-11-22 ENCOUNTER — HOSPITAL ENCOUNTER (OUTPATIENT)
Dept: HOSPITAL 53 - M ONCR | Age: 64
LOS: 8 days | End: 2024-11-30
Attending: RADIOLOGY
Payer: COMMERCIAL

## 2024-11-22 DIAGNOSIS — C34.11: ICD-10-CM

## 2024-11-22 DIAGNOSIS — Z51.0: Primary | ICD-10-CM

## 2025-02-20 ENCOUNTER — HOSPITAL ENCOUNTER (OUTPATIENT)
Dept: HOSPITAL 53 - M RAD | Age: 65
End: 2025-02-20
Attending: RADIOLOGY

## 2025-02-20 DIAGNOSIS — C34.11: Primary | ICD-10-CM

## 2025-02-20 PROCEDURE — 71260 CT THORAX DX C+: CPT

## 2025-03-07 ENCOUNTER — HOSPITAL ENCOUNTER (OUTPATIENT)
Dept: HOSPITAL 53 - M ONCR | Age: 65
End: 2025-03-07
Attending: RADIOLOGY
Payer: COMMERCIAL

## 2025-03-07 DIAGNOSIS — Z92.21: ICD-10-CM

## 2025-03-07 DIAGNOSIS — Z79.890: ICD-10-CM

## 2025-03-07 DIAGNOSIS — Z87.891: ICD-10-CM

## 2025-03-07 DIAGNOSIS — Z92.3: ICD-10-CM

## 2025-03-07 DIAGNOSIS — C09.0: Primary | ICD-10-CM

## 2025-03-07 DIAGNOSIS — Z79.899: ICD-10-CM

## 2025-03-07 DIAGNOSIS — C34.11: ICD-10-CM

## 2025-03-07 DIAGNOSIS — Z79.01: ICD-10-CM

## 2025-03-07 PROCEDURE — 31575 DIAGNOSTIC LARYNGOSCOPY: CPT
